# Patient Record
Sex: FEMALE | Race: WHITE
[De-identification: names, ages, dates, MRNs, and addresses within clinical notes are randomized per-mention and may not be internally consistent; named-entity substitution may affect disease eponyms.]

---

## 2017-09-18 ENCOUNTER — HOSPITAL ENCOUNTER (EMERGENCY)
Dept: HOSPITAL 56 - MW.ED | Age: 62
Discharge: HOME | End: 2017-09-18
Payer: MEDICARE

## 2017-09-18 VITALS — DIASTOLIC BLOOD PRESSURE: 71 MMHG | SYSTOLIC BLOOD PRESSURE: 118 MMHG

## 2017-09-18 DIAGNOSIS — E78.00: ICD-10-CM

## 2017-09-18 DIAGNOSIS — F41.9: ICD-10-CM

## 2017-09-18 DIAGNOSIS — F32.9: ICD-10-CM

## 2017-09-18 DIAGNOSIS — Z88.5: ICD-10-CM
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
## 2017-09-18 DIAGNOSIS — S01.81XA: Primary | ICD-10-CM

## 2017-09-18 DIAGNOSIS — F17.210: ICD-10-CM

## 2017-09-18 DIAGNOSIS — Z79.899: ICD-10-CM

## 2017-09-18 DIAGNOSIS — S09.90XA: ICD-10-CM

## 2017-09-18 DIAGNOSIS — I10: ICD-10-CM

## 2017-09-18 NOTE — EDM.PDOC
ED HPI GENERAL MEDICAL PROBLEM





- General


Chief Complaint: Laceration


Stated Complaint: PT FELL AND HURT HEAD


Time Seen by Provider: 17 21:24





- History of Present Illness


INITIAL COMMENTS - FREE TEXT/NARRATIVE: 


HISTORY AND PHYSICAL:





History of present illness:


Patient's a 62-year-old white female presents status post fall which he struck 

her forehead sustaining laceration occurred about 4:00 this morning her [place] 

presentation was in hope that it would not need suturing family can answer 

otherwise no loss consciousness no dizziness no chest pain shortness breath or 

other concern 








Review of systems: 


As per history of present illness and below otherwise all systems reviewed and 

negative.





Past medical history: 


As per history of present illness and as reviewed below otherwise 

noncontributory.





Surgical history: 


As per history of present illness and as reviewed below otherwise 

noncontributory.





Social history: 


No reported history of drug or alcohol abuse.





Family history: 


As per history of present illness and as reviewed below otherwise 

noncontributory.





Physical exam:


HEENT: 3 cm moderate depth laceration of forehead noted no step-off no 

depression or crepitation, normocephalic, pupils reactive, negative for 

conjunctival pallor or scleral icterus, mucous membranes moist, throat clear, 

neck supple, nontender, trachea midline.


Lungs: Clear to auscultation, breath sounds equal bilaterally, chest nontender.


Heart: S1S2, regular, negative for clicks, rubs, or JVD.


Abdomen: Soft, nondistended, nontender. Negative for masses or 

hepatosplenomegaly. Negative for costovertebral tenderness.


Pelvis: Stable nontender.


Genitourinary: Deferred.


Rectal: Deferred.


Extremities: Atraumatic, negative for cords or calf pain. Neurovascular 

unremarkable.


Neuro: Awake, alert, oriented. Cranial nerves II through XII unremarkable. 

Cerebellum unremarkable. Motor and sensory unremarkable throughout. Exam 

nonfocal.





Diagnostics:


Deferred





Therapeutics:


Patient's tetanus was updated she is anesthetized 1% lidocaine without 

epinephrine and irrigated scopes month 0.9 normal saline and closed with 50


 absorbable suture bacitracin was applied


Impression: 


#1 head injury with facial laceration


Definitive disposition and diagnosis as appropriate pending reevaluation and 

review of above.








- Related Data


 Allergies











Allergy/AdvReac Type Severity Reaction Status Date / Time


 


morphine Allergy  Difficulty Verified 17 12:26





   Breathing  











Home Meds: 


 Home Meds





ALPRAZolam [Alprazolam] 1 mg PO TID 17 [History]


DULoxetine [Cymbalta] 60 mg PO BID 17 [History]


Furosemide 20 mg PO DAILY 17 [History]


Gabapentin [Neurontin] 600 mg PO QID PRN 17 [History]


Methylphenidate HCl 20 mg PO BID 17 [History]


Metoprolol Succinate [Toprol XL] 25 mg PO DAILY 17 [History]


Oxybutynin 5 mg PO DAILY 17 [History]


Rosuvastatin [Crestor] 10 mg PO DAILY 17 [History]


amLODIPine [Norvasc] 10 mg PO DAILY 17 [History]


buPROPion HCl [Wellbutrin Xl] 300 mg PO DAILY 17 [History]


buPROPion [Wellbutrin XL] 150 mg PO DAILY 17 [History]











Past Medical History


Cardiovascular History: Reports: High Cholesterol, Hypertension


Musculoskeletal History: Reports: Back Pain, Chronic


Psychiatric History: Reports: Anxiety, Depression





- Past Surgical History


Female  Surgical History: Reports:  Section





Social & Family History





- Family History


Family Medical History: Noncontributory





- Tobacco Use


Smoking Status *Q: Current Every Day Smoker


Years of Tobacco use: 30


Packs/Tins Daily: 1





- Caffeine Use


Caffeine Use: Reports: Soda


Other Caffeine Use: soda all day long





- Recreational Drug Use


Recreational Drug Use: No





ED ROS GENERAL





- Review of Systems


Review Of Systems: ROS reveals no pertinent complaints other than HPI.





ED EXAM, SKIN/RASH


Exam: See Below (See dictation)





Departure





- Departure


Time of Disposition: 21:30


Disposition: Home, Self-Care 01


Condition: Good


Clinical Impression: 


 Head injury, Facial laceration








- Discharge Information


Referrals: 


PCP,None [Primary Care Provider] - 


Additional Instructions: 


The following information is given to patients seen in the emergency department 

who are being discharged to home. This information is to outline your options 

for follow-up care. We provide all patients seen in our emergency department 

with a follow-up referral.





The need for follow-up, as well as the timing and circumstances, are variable 

depending upon the specifics of your emergency department visit.





If you don't have a primary care physician on staff, we will provide you with a 

referral. We always advise you to contact your personal physician following an 

emergency department visit to inform them of the circumstance of the visit and 

for follow-up with them and/or the need for any referrals to a consulting 

specialist.





The emergency department will also refer you to a specialist when appropriate. 

This referral assures that you have the opportunity for followup care with a 

specialist. All of these measure are taken in an effort to provide you with 

optimal care, which includes your followup.





Under all circumstances we always encourage you to contact your private 

physician who remains a resource for coordinating  your care. When calling for 

followup care, please make the office aware that this follow-up is from your 

recent emergency room visit. If for any reason you are refused follow-up, 

please contact the [facility] emergency department at [phone] 

and asked to speak to the emergency department charge nurse.























Wound check PMD follow-up 24-48 hours return as needed as discussed

## 2018-01-23 ENCOUNTER — HOSPITAL ENCOUNTER (INPATIENT)
Dept: HOSPITAL 56 - MW.ED | Age: 63
LOS: 3 days | Discharge: HOME | DRG: 558 | End: 2018-01-26
Attending: INTERNAL MEDICINE | Admitting: INTERNAL MEDICINE
Payer: MEDICARE

## 2018-01-23 DIAGNOSIS — Z88.5: ICD-10-CM

## 2018-01-23 DIAGNOSIS — M79.604: ICD-10-CM

## 2018-01-23 DIAGNOSIS — Z88.2: ICD-10-CM

## 2018-01-23 DIAGNOSIS — Z79.899: ICD-10-CM

## 2018-01-23 DIAGNOSIS — F41.8: ICD-10-CM

## 2018-01-23 DIAGNOSIS — E78.5: ICD-10-CM

## 2018-01-23 DIAGNOSIS — F17.210: ICD-10-CM

## 2018-01-23 DIAGNOSIS — I10: ICD-10-CM

## 2018-01-23 DIAGNOSIS — N39.0: ICD-10-CM

## 2018-01-23 DIAGNOSIS — E78.00: ICD-10-CM

## 2018-01-23 DIAGNOSIS — F17.200: ICD-10-CM

## 2018-01-23 DIAGNOSIS — K21.9: ICD-10-CM

## 2018-01-23 DIAGNOSIS — M62.82: Primary | ICD-10-CM

## 2018-01-23 DIAGNOSIS — J44.9: ICD-10-CM

## 2018-01-23 DIAGNOSIS — M79.605: ICD-10-CM

## 2018-01-23 LAB
APAP SERPL-MCNC: < 3 UG/ML
CHLORIDE SERPL-SCNC: 108 MMOL/L (ref 98–110)
SODIUM SERPL-SCNC: 140 MMOL/L (ref 136–146)

## 2018-01-23 PROCEDURE — 96360 HYDRATION IV INFUSION INIT: CPT

## 2018-01-23 PROCEDURE — 71045 X-RAY EXAM CHEST 1 VIEW: CPT

## 2018-01-23 PROCEDURE — 82553 CREATINE MB FRACTION: CPT

## 2018-01-23 PROCEDURE — 84484 ASSAY OF TROPONIN QUANT: CPT

## 2018-01-23 PROCEDURE — 80305 DRUG TEST PRSMV DIR OPT OBS: CPT

## 2018-01-23 PROCEDURE — G0480 DRUG TEST DEF 1-7 CLASSES: HCPCS

## 2018-01-23 PROCEDURE — 85610 PROTHROMBIN TIME: CPT

## 2018-01-23 PROCEDURE — 81001 URINALYSIS AUTO W/SCOPE: CPT

## 2018-01-23 PROCEDURE — G0008 ADMIN INFLUENZA VIRUS VAC: HCPCS

## 2018-01-23 PROCEDURE — 85025 COMPLETE CBC W/AUTO DIFF WBC: CPT

## 2018-01-23 PROCEDURE — 99285 EMERGENCY DEPT VISIT HI MDM: CPT

## 2018-01-23 PROCEDURE — 83690 ASSAY OF LIPASE: CPT

## 2018-01-23 PROCEDURE — 82150 ASSAY OF AMYLASE: CPT

## 2018-01-23 PROCEDURE — 80053 COMPREHEN METABOLIC PANEL: CPT

## 2018-01-23 PROCEDURE — 82550 ASSAY OF CK (CPK): CPT

## 2018-01-23 PROCEDURE — 87086 URINE CULTURE/COLONY COUNT: CPT

## 2018-01-23 PROCEDURE — 36415 COLL VENOUS BLD VENIPUNCTURE: CPT

## 2018-01-23 RX ADMIN — CEFTRIAXONE SCH MLS/HR: 1 INJECTION, SOLUTION INTRAVENOUS at 14:42

## 2018-01-23 RX ADMIN — DEXTROSE SCH UNITS: 10 SOLUTION INTRAVENOUS at 21:04

## 2018-01-23 RX ADMIN — DEXTROSE SCH UNITS: 10 SOLUTION INTRAVENOUS at 14:44

## 2018-01-23 NOTE — EDM.PDOC
ED HPI GENERAL MEDICAL PROBLEM





- General


Stated Complaint: MENTAL EVAL


Time Seen by Provider: 18 09:07


Source of Information: Reports: Patient





- History of Present Illness


INITIAL COMMENTS - FREE TEXT/NARRATIVE: 





HISTORY AND PHYSICAL:


History of present illness:


[Patient presents with generalized weakness via EMS





She has a history of anxiety and depression she takes Xanax and is having some 

anxiety problems last night mom states she may have taken extra Xanax as she 

was unable walk to the bathroom this morning with and had to crawl with 

assistance due to weakness





However she has been alert speaking with us does not appear to have taken 

additional Xanax her pill count is accurate





No fever nausea vomiting chills sweats does generalized weakness











Denies suicidal homicidal ideation]


Review of systems: 


As per history of present illness and below otherwise all systems reviewed and 

negative.


Past medical history: 


As per history of present illness and as reviewed below otherwise 

noncontributory.


Surgical history: 


As per history of present illness and as reviewed below otherwise 

noncontributory.


Social history: 


No reported history of drug or alcohol abuse.


Family history: 


As per history of present illness and as reviewed below otherwise 

noncontributory.


Physical exam:


HEENT: Atraumatic, normocephalic, pupils reactive, negative for conjunctival 

pallor or scleral icterus, mucous membranes moist, throat clear, neck supple, 

nontender, trachea midline.


Lungs: Clear to auscultation, breath sounds equal bilaterally, chest nontender.


Heart: S1S2, regular, negative for clicks, rubs, or JVD.


Abdomen: Soft, nondistended, nontender. Negative for masses or 

hepatosplenomegaly. Negative for costovertebral tenderness.


Pelvis: Stable nontender.


Genitourinary: Deferred.


Rectal: Deferred.


Extremities: Atraumatic, negative for cords or calf pain. Neurovascular 

unremarkable.


Neuro: Awake, alert, oriented. Cranial nerves II through XII unremarkable. 

Cerebellum unremarkable. Motor and sensory unremarkable throughout. Exam 

nonfocal.


Diagnostics:


[Mental health evaluation/3C lab


]CBC CMP cardiac enzymes








Therapeutics:


[1 L bolus per EMS


1 L bolus here than fluid strength 150


Stop Crestor


]


Impression: 


[Rhabdomyolysis


Renal insufficiency


Dehydration





]


Definitive disposition and diagnosis as appropriate pending reevaluation and 

review of above.





- Related Data


 Allergies











Allergy/AdvReac Type Severity Reaction Status Date / Time


 


morphine Allergy  Difficulty Verified 17 12:26





   Breathing  


 


Sulfa (Sulfonamide Allergy  Hives Verified 17 21:31





Antibiotics)     











Home Meds: 


 Home Meds





ALPRAZolam [Alprazolam] 1 mg PO TID 17 [History]


DULoxetine [Cymbalta] 60 mg PO BID 17 [History]


Furosemide 20 mg PO DAILY 17 [History]


Gabapentin [Neurontin] 600 mg PO QID PRN 17 [History]


Methylphenidate HCl 20 mg PO BID 17 [History]


Metoprolol Succinate [Toprol XL] 25 mg PO DAILY 17 [History]


Oxybutynin 5 mg PO DAILY 17 [History]


Rosuvastatin [Crestor] 10 mg PO DAILY 17 [History]


amLODIPine [Norvasc] 10 mg PO DAILY 17 [History]


buPROPion HCl [Wellbutrin Xl] 300 mg PO DAILY 17 [History]


buPROPion [Wellbutrin XL] 150 mg PO DAILY 17 [History]











Past Medical History


Cardiovascular History: Reports: High Cholesterol, Hypertension


OB/GYN History: Reports: Pregnancy


Musculoskeletal History: Reports: Back Pain, Chronic


Psychiatric History: Reports: Anxiety, Depression





- Past Surgical History


HEENT Surgical History: Reports: Tonsillectomy


Female  Surgical History: Reports:  Section





Social & Family History





- Family History


Family Medical History: Noncontributory





- Tobacco Use


Smoking Status *Q: Current Every Day Smoker


Years of Tobacco use: 20


Packs/Tins Daily: 1





- Caffeine Use


Caffeine Use: Reports: Coffee


Other Caffeine Use: soda all day long





- Recreational Drug Use


Recreational Drug Use: No





ED ROS GENERAL





- Review of Systems


Review Of Systems: ROS reveals no pertinent complaints other than HPI.





ED EXAM, GENERAL





- Physical Exam


Exam: See Below





Course





- Vital Signs


Last Recorded V/S: 


 Last Vital Signs











Temp  97.4 F   18 09:13


 


Pulse  81   18 09:59


 


Resp  13   18 09:59


 


BP  137/87   18 09:59


 


Pulse Ox  92 L  18 09:59














- Orders/Labs/Meds


Orders: 


 Active Orders 24 hr











 Category Date Time Status


 


 EKG Documentation Completion [RC] STAT Care  18 09:06 Active


 


 Sodium Chloride 0.9% [Normal Saline] 1,000 ml Med  18 10:08 Active





 IV STAT   








 Medication Orders





Sodium Chloride (Normal Saline)  1,000 mls @ 999 mls/hr IV STAT ONE


   Stop: 18 11:08


   Last Admin: 18 10:29  Dose: 999 mls/hr








Labs: 


 Laboratory Tests











  18 Range/Units





  09:19 09:19 09:19 


 


WBC  14.24 H    (4.0-11.0)  K/uL


 


RBC  4.66    (4.30-5.90)  M/uL


 


Hgb  14.0    (12.0-16.0)  g/dL


 


Hct  41.4    (36.0-46.0)  %


 


MCV  88.8    (80.0-98.0)  fL


 


MCH  30.0    (27.0-32.0)  pg


 


MCHC  33.8    (31.0-37.0)  g/dL


 


RDW Std Deviation  46.4    (28.0-62.0)  fl


 


RDW Coeff of Marla  14    (11.0-15.0)  %


 


Plt Count  266    (150-400)  K/uL


 


MPV  10.50    (7.40-12.00)  fL


 


Neut % (Auto)  76.4    (48.0-80.0)  %


 


Lymph % (Auto)  16.0    (16.0-40.0)  %


 


Mono % (Auto)  6.3    (0.0-15.0)  %


 


Eos % (Auto)  0.9    (0.0-7.0)  %


 


Baso % (Auto)  0.4    (0.0-1.5)  %


 


Neut # (Auto)  10.9 H    (1.4-5.7)  K/uL


 


Lymph # (Auto)  2.3    (0.6-2.4)  K/uL


 


Mono # (Auto)  0.9 H    (0.0-0.8)  K/uL


 


Eos # (Auto)  0.1    (0.0-0.7)  K/uL


 


Baso # (Auto)  0.1    (0.0-0.1)  K/uL


 


Nucleated RBC %  0.0    /100WBC


 


Nucleated RBCs #  0    K/uL


 


INR   1.01   


 


Sodium    140  (136-146)  mmol/L


 


Potassium    4.1  (3.5-5.1)  mmol/L


 


Chloride    108  ()  mmol/L


 


Carbon Dioxide    21  (21-31)  mmol/L


 


BUN    21  (6.0-23.0)  mg/dL


 


Creatinine    1.6 H  (0.6-1.5)  mg/dL


 


Est Cr Clr Drug Dosing    26.52  mL/min


 


Estimated GFR (MDRD)    32.8  ml/min


 


Glucose    108  ()  mg/dL


 


Calcium    9.1  (8.8-10.8)  mg/dL


 


Total Bilirubin    0.8  (0.1-1.5)  mg/dL


 


AST    140 H  (5-40)  IU/L


 


ALT    30  (8-54)  IU/L


 


Alkaline Phosphatase    92  ()  


 


Creatine Kinase    57424 H  (9-236)  IU/L


 


CK-MB (CK-2)    31.1 H  (0-6.6)  ng/ml


 


Troponin I    < 0.10  (0.0-0.29)  NG/ML


 


Total Protein    6.6  (6.0-8.0)  g/dL


 


Albumin    3.8  (3.4-4.8)  g/dL


 


Globulin    2.8  (2.0-3.5)  g/dL


 


Albumin/Globulin Ratio    1.4  (1.3-2.8)  


 


Amylase    28  (10-90)  U/L


 


Lipase    < 9  (7-80)  U/L


 


Urine Color     


 


Urine Appearance     


 


Urine pH     (5.0-8.0)  


 


Ur Specific Gravity     (1.001-1.035)  


 


Urine Protein     (NEGATIVE)  mg/dL


 


Urine Glucose (UA)     (NEGATIVE)  mg/dL


 


Urine Ketones     (NEGATIVE)  mg/dL


 


Urine Occult Blood     (NEGATIVE)  


 


Urine Nitrite     (NEGATIVE)  


 


Urine Bilirubin     (NEGATIVE)  


 


Urine Urobilinogen     (<2.0)  EU/dL


 


Ur Leukocyte Esterase     (NEGATIVE)  


 


Urine RBC     (0-2/HPF)  


 


Urine WBC     (0-5/HPF)  


 


Ur Epithelial Cells     (NONE-FEW)  


 


Urine Bacteria     (NEGATIVE)  


 


Urine Mucus     (NONE-MOD)  


 


Salicylates    < 5.0  (0-20)  mg/dL


 


Urine Opiates Screen     (NEGATIVE)  


 


Ur Oxycodone Screen     (NEGATIVE)  


 


Urine Methadone Screen     (NEGATIVE)  


 


Acetaminophen    < 3.0  ug/mL


 


Ur Barbiturates Screen     (NEGATIVE)  


 


Ur Phencyclidine Scrn     (NEGATIVE)  


 


Ur Amphetamine Screen     (NEGATIVE)  


 


U Methamphetamines Scrn     (NEGATIVE)  


 


U Benzodiazepines Scrn     (NEGATIVE)  


 


U Cocaine Metab Screen     (NEGATIVE)  


 


U Marijuana (THC) Screen     (NEGATIVE)  


 


Ethyl Alcohol    < 10.0  mg/dL














  18 Range/Units





  09:48 09:48 


 


WBC    (4.0-11.0)  K/uL


 


RBC    (4.30-5.90)  M/uL


 


Hgb    (12.0-16.0)  g/dL


 


Hct    (36.0-46.0)  %


 


MCV    (80.0-98.0)  fL


 


MCH    (27.0-32.0)  pg


 


MCHC    (31.0-37.0)  g/dL


 


RDW Std Deviation    (28.0-62.0)  fl


 


RDW Coeff of Marla    (11.0-15.0)  %


 


Plt Count    (150-400)  K/uL


 


MPV    (7.40-12.00)  fL


 


Neut % (Auto)    (48.0-80.0)  %


 


Lymph % (Auto)    (16.0-40.0)  %


 


Mono % (Auto)    (0.0-15.0)  %


 


Eos % (Auto)    (0.0-7.0)  %


 


Baso % (Auto)    (0.0-1.5)  %


 


Neut # (Auto)    (1.4-5.7)  K/uL


 


Lymph # (Auto)    (0.6-2.4)  K/uL


 


Mono # (Auto)    (0.0-0.8)  K/uL


 


Eos # (Auto)    (0.0-0.7)  K/uL


 


Baso # (Auto)    (0.0-0.1)  K/uL


 


Nucleated RBC %    /100WBC


 


Nucleated RBCs #    K/uL


 


INR    


 


Sodium    (136-146)  mmol/L


 


Potassium    (3.5-5.1)  mmol/L


 


Chloride    ()  mmol/L


 


Carbon Dioxide    (21-31)  mmol/L


 


BUN    (6.0-23.0)  mg/dL


 


Creatinine    (0.6-1.5)  mg/dL


 


Est Cr Clr Drug Dosing    mL/min


 


Estimated GFR (MDRD)    ml/min


 


Glucose    ()  mg/dL


 


Calcium    (8.8-10.8)  mg/dL


 


Total Bilirubin    (0.1-1.5)  mg/dL


 


AST    (5-40)  IU/L


 


ALT    (8-54)  IU/L


 


Alkaline Phosphatase    ()  


 


Creatine Kinase    (9-236)  IU/L


 


CK-MB (CK-2)    (0-6.6)  ng/ml


 


Troponin I    (0.0-0.29)  NG/ML


 


Total Protein    (6.0-8.0)  g/dL


 


Albumin    (3.4-4.8)  g/dL


 


Globulin    (2.0-3.5)  g/dL


 


Albumin/Globulin Ratio    (1.3-2.8)  


 


Amylase    (10-90)  U/L


 


Lipase    (7-80)  U/L


 


Urine Color   YELLOW  


 


Urine Appearance   CLEAR  


 


Urine pH   6.0  (5.0-8.0)  


 


Ur Specific Gravity   1.020  (1.001-1.035)  


 


Urine Protein   TRACE  (NEGATIVE)  mg/dL


 


Urine Glucose (UA)   NEGATIVE  (NEGATIVE)  mg/dL


 


Urine Ketones   NEGATIVE  (NEGATIVE)  mg/dL


 


Urine Occult Blood   LARGE H  (NEGATIVE)  


 


Urine Nitrite   NEGATIVE  (NEGATIVE)  


 


Urine Bilirubin   NEGATIVE  (NEGATIVE)  


 


Urine Urobilinogen   0.2  (<2.0)  EU/dL


 


Ur Leukocyte Esterase   SMALL  (NEGATIVE)  


 


Urine RBC   0-1  (0-2/HPF)  


 


Urine WBC   3-5  (0-5/HPF)  


 


Ur Epithelial Cells   FEW  (NONE-FEW)  


 


Urine Bacteria   FEW  (NEGATIVE)  


 


Urine Mucus   LIGHT  (NONE-MOD)  


 


Salicylates    (0-20)  mg/dL


 


Urine Opiates Screen  NEGATIVE   (NEGATIVE)  


 


Ur Oxycodone Screen  NEGATIVE   (NEGATIVE)  


 


Urine Methadone Screen  NEGATIVE   (NEGATIVE)  


 


Acetaminophen    ug/mL


 


Ur Barbiturates Screen  NEGATIVE   (NEGATIVE)  


 


Ur Phencyclidine Scrn  NEGATIVE   (NEGATIVE)  


 


Ur Amphetamine Screen  NEGATIVE   (NEGATIVE)  


 


U Methamphetamines Scrn  NEGATIVE   (NEGATIVE)  


 


U Benzodiazepines Scrn  POSITIVE   (NEGATIVE)  


 


U Cocaine Metab Screen  NEGATIVE   (NEGATIVE)  


 


U Marijuana (THC) Screen  NEGATIVE   (NEGATIVE)  


 


Ethyl Alcohol    mg/dL











Meds: 


Medications











Generic Name Dose Route Start Last Admin





  Trade Name Freq  PRN Reason Stop Dose Admin


 


Sodium Chloride  1,000 mls @ 999 mls/hr  18 10:08  18 10:29





  Normal Saline  IV  18 11:08  999 mls/hr





  STAT ONE   Administration














Departure





- Departure


Time of Disposition: 10:55


Disposition: Home, Self-Care 01


Condition: Fair


Clinical Impression: 


 Rhabdomyolysis








- Discharge Information


Referrals: 


PCP,Unknown [Primary Care Provider] - 





- My Orders


Last 24 Hours: 


My Active Orders





18 09:06


EKG Documentation Completion [RC] STAT 





18 10:08


Sodium Chloride 0.9% [Normal Saline] 1,000 ml IV STAT 














- Assessment/Plan


Last 24 Hours: 


My Active Orders





18 09:06


EKG Documentation Completion [RC] STAT 





18 10:08


Sodium Chloride 0.9% [Normal Saline] 1,000 ml IV STAT

## 2018-01-23 NOTE — PCM.HP
H&P History of Present Illness





- General


Admit Problem/Dx: 


 Admission Diagnosis/Problem





Admission Diagnosis/Problem      Rhabdomyolysis











- History of Present Illness


Initial Comments - Free Text/Narative: 





63 yo female with pmh of anxiety and depression who presents to the ED with 

complaint of generalized weakness.  PAtient reports muscle aches in her left 

leg and not being able to get up to go the bathroom.  She report periodic pains 

in the past but these have gone away.  She appears lethargic but patient denies 

any drowsiness and denies taking any extra xanax, alcohol or illicit drugs.  

She has been taking her crestor.  She reports she gets out of bed daily and has 

not been lying down more than usual.





- Related Data


Allergies/Adverse Reactions: 


 Allergies











Allergy/AdvReac Type Severity Reaction Status Date / Time


 


morphine Allergy  Difficulty Verified 17 12:26





   Breathing  


 


Sulfa (Sulfonamide Allergy  Hives Verified 17 21:31





Antibiotics)     











Home Medications: 


 Home Meds





ALPRAZolam [Alprazolam] 0.5 - 1 mg PO TID PRN 17 [History]


DULoxetine [Cymbalta] 60 mg PO BID 17 [History]


Furosemide 20 mg PO DAILY 17 [History]


Methylphenidate HCl 20 mg PO TID 17 [History]


Metoprolol Succinate [Toprol XL] 25 mg PO DAILY 17 [History]


Oxybutynin 5 mg PO DAILY 17 [History]


Rosuvastatin [Crestor] 10 mg PO BEDTIME 17 [History]


amLODIPine [Norvasc] 10 mg PO DAILY 17 [History]


Omeprazole 40 mg PO DAILY 18 [History]


lamoTRIgine [Lamotrigine] 100 mg PO DAILY 18 [History]


buPROPion HCl [Wellbutrin Xl] 150 mg PO DAILY 18 [History]


buPROPion HCl [Wellbutrin Xl] 300 mg PO DAILY 18 [History]











Past Medical History


HEENT History: Reports: Cataract


Cardiovascular History: Reports: High Cholesterol, Hypertension


Respiratory History: Reports: COPD


Gastrointestinal History: Reports: GERD


OB/GYN History: Reports: Pregnancy


Musculoskeletal History: Reports: Back Pain, Chronic


Psychiatric History: Reports: Anxiety, Depression





- Infectious Disease History


Infectious Disease History: Reports: Chicken Pox, Measles, Mumps





- Past Surgical History


HEENT Surgical History: Reports: Cataract Surgery, Tonsillectomy


Cardiovascular Surgical History: Reports: None


Respiratory Surgical History: Reports: None


GI Surgical History: Reports: None


Female  Surgical History: Reports:  Section, Nephrectomy


Musculoskeletal Surgical History: Reports: None





Social & Family History





- Family History


Family Medical History: Noncontributory





- Tobacco Use


Smoking Status *Q: Current Every Day Smoker


Years of Tobacco use: 30


Packs/Tins Daily: 0.5


Second Hand Smoke Exposure: No





- Caffeine Use


Caffeine Use: Reports: Soda


Other Caffeine Use: soda all day long





- Recreational Drug Use


Recreational Drug Use: No





H&P Review of Systems





- Review of Systems:


Review Of Systems: ROS reveals no pertinent complaints other than HPI.





Exam





- Exam


Exam: See Below





- Vital Signs


Vital Signs: 


 Last Vital Signs











Temp  36.6 C   18 12:47


 


Pulse  83   18 12:47


 


Resp  16   18 12:47


 


BP  143/74 H  18 12:47


 


Pulse Ox  89 L  18 12:47











Weight: 83.053 kg





- Exam


General: Alert, Oriented


HEENT: Mucosa Moist & Pink


Lungs: Clear to Auscultation, Normal Respiratory Effort


Cardiovascular: Regular Rate, Regular Rhythm


GI/Abdominal Exam: Soft, Non-Tender


Extremities: Normal Range of Motion, Non-Tender, No Pedal Edema, Normal 

Capillary Refill.  No: Joint Swelling, Minerva's Sign, Leg Pain, Mottled, Redness


Skin: Warm, Dry, Intact


Neurological: Normal Tone, Sensation Intact





- Patient Data


Result Diagrams: 


 18 04:09





 18 04:09





*Q Meaningful Use (ADM)





- VTE *Q


VTE Criteria *Q: 








- Stroke *Q


Stroke Criteria *Q: 








- AMI *Q


AMI Criteria *Q: 





Problem List Initiated/Reviewed/Updated: Yes


Orders Last 24hrs: 


 Active Orders 24 hr











 Category Date Time Status


 


 Antiembolic Devices [RC] PER UNIT ROUTINE Care  18 13:24 Ordered


 


 VTE/DVT Education [RC] PER UNIT ROUTINE Care  18 13:23 Ordered


 


 Vital Signs [RC] Q4H Care  18 13:23 Ordered


 


 Regular Diet [DIET] Diet  18 Breakfast Ordered


 


 BASIC METABOLIC PANEL,BMP [CHEM] AM Lab  18 05:11 Ordered


 


 BASIC METABOLIC PANEL,BMP [CHEM] AM Lab  18 05:11 Ordered


 


 CBC WITH AUTO DIFF [HEME] AM Lab  18 05:11 Ordered


 


 CBC WITH AUTO DIFF [HEME] AM Lab  18 05:11 Ordered


 


 CPK [CREATINE KINASE,CK] [CHEM] AM Lab  18 05:11 Ordered


 


 CPK [CREATINE KINASE,CK] [CHEM] AM Lab  18 05:11 Ordered


 


 CPK [CREATINE KINASE,CK] [CHEM] Routine Lab  18 18:00 Ordered


 


 CULTURE URINE [] Routine Lab  18 12:54 Uncollected


 


 INFLUENZA A+B AG SCREEN [RM] Routine Lab  18 13:23 Uncollected


 


 Enoxaparin [Lovenox] Med  18 09:00 Ordered





 40 mg SUBCUT DAILY   


 


 Ondansetron [Zofran] Med  18 13:23 Ordered





 4 mg IVPUSH Q4H PRN   


 


 Sodium Chloride 0.9% [Normal Saline] 1,000 ml Med  18 13:22 Ordered





 IV .Bolus   


 


 Sodium Chloride 0.9% [Normal Saline] 1,000 ml Med  18 13:30 Ordered





 IV ASDIRECTED   


 


 cefTRIAXone [Rocephin in Dextrose,Iso-Osm 1 GM/50 ML] 1 Med  18 13:00 

Active





 gm   





 Premix Bag 1 bag   





 IV Q24H   


 


 Sequential Compression Device [OM.PC] Routine Oth  18 13:23 Ordered


 


 Resuscitation Status Routine Resus Stat  18 13:23 Ordered








 Medication Orders





Enoxaparin Sodium (Lovenox)  40 mg SUBCUT DAILY SCOTTY


Ceftriaxone Sodium/Dextrose 1 (gm/ Premix)  50 mls @ 100 mls/hr IV Q24H SCOTTY


Sodium Chloride (Normal Saline)  1,000 mls @ 1,000 mls/hr IV .Bolus ONE


   Stop: 18 14:21


Sodium Chloride (Normal Saline)  1,000 mls @ 250 mls/hr IV ASDIRECTED SCOTTY


Ondansetron HCl (Zofran)  4 mg IVPUSH Q4H PRN


   PRN Reason: Nausea/Vomiting








Assessment/Plan Comment:: 





63 yo female admitted for rhabdomyolysis and UTI.  Will resuscitate 

appropriately with IV fluids and trend CPK.  Will place on rocephin and get 

urine cultures.  Her rhabdomyolysis could be from her crestor or xanax so will 

hold the medications.

## 2018-01-24 LAB
CHLORIDE SERPL-SCNC: 113 MMOL/L (ref 98–110)
SODIUM SERPL-SCNC: 145 MMOL/L (ref 136–146)

## 2018-01-24 RX ADMIN — CEFTRIAXONE SCH MLS/HR: 1 INJECTION, SOLUTION INTRAVENOUS at 13:08

## 2018-01-24 RX ADMIN — BUPROPION HYDROCHLORIDE SCH MG: 150 TABLET, EXTENDED RELEASE ORAL at 15:40

## 2018-01-24 RX ADMIN — DEXTROSE SCH UNITS: 10 SOLUTION INTRAVENOUS at 08:16

## 2018-01-24 RX ADMIN — DEXTROSE SCH UNITS: 10 SOLUTION INTRAVENOUS at 20:48

## 2018-01-24 RX ADMIN — DEXTROSE SCH: 10 SOLUTION INTRAVENOUS at 21:08

## 2018-01-24 RX ADMIN — DEXTROSE SCH UNITS: 10 SOLUTION INTRAVENOUS at 14:45

## 2018-01-24 NOTE — PCM.PN
- General Info


Date of Service: 01/24/18


Subjective Update: 





Patient states that her weakness has improved. She is anxious and is adamant 

about leaving because she would like to smoke a cigarette. She was also 

concerned that she hasn't received her Xanax while she was here. Besides this, 

she denies having any complaints such as fevers chills pain nausea or vomiting.





- Patient Data


Vitals - Most Recent: 


 Last Vital Signs











Temp  36.3 C   01/24/18 12:00


 


Pulse  98   01/24/18 12:00


 


Resp  16   01/24/18 12:00


 


BP  129/78   01/24/18 12:00


 


Pulse Ox  96   01/24/18 12:00











Weight - Most Recent: 83.053 kg


I&O - Last 24 Hours: 


 Intake & Output











 01/23/18 01/24/18 01/24/18





 22:59 06:59 14:59


 


Intake Total 3100 2250 


 


Output Total 800 1580 


 


Balance 2300 670 











Lab Results Last 24 Hours: 


 Laboratory Results - last 24 hr











  01/23/18 01/23/18 01/23/18 Range/Units





  16:05 17:57 21:41 


 


WBC     (4.0-11.0)  K/uL


 


RBC     (4.30-5.90)  M/uL


 


Hgb     (12.0-16.0)  g/dL


 


Hct     (36.0-46.0)  %


 


MCV     (80.0-98.0)  fL


 


MCH     (27.0-32.0)  pg


 


MCHC     (31.0-37.0)  g/dL


 


RDW Std Deviation     (28.0-62.0)  fl


 


RDW Coeff of Marla     (11.0-15.0)  %


 


Plt Count     (150-400)  K/uL


 


MPV     (7.40-12.00)  fL


 


Neut % (Auto)     (48.0-80.0)  %


 


Lymph % (Auto)     (16.0-40.0)  %


 


Mono % (Auto)     (0.0-15.0)  %


 


Eos % (Auto)     (0.0-7.0)  %


 


Baso % (Auto)     (0.0-1.5)  %


 


Neut # (Auto)     (1.4-5.7)  K/uL


 


Lymph # (Auto)     (0.6-2.4)  K/uL


 


Mono # (Auto)     (0.0-0.8)  K/uL


 


Eos # (Auto)     (0.0-0.7)  K/uL


 


Baso # (Auto)     (0.0-0.1)  K/uL


 


Nucleated RBC %     /100WBC


 


Nucleated RBCs #     K/uL


 


Sodium     (136-146)  mmol/L


 


Potassium     (3.5-5.1)  mmol/L


 


Chloride     ()  mmol/L


 


Carbon Dioxide     (21-31)  mmol/L


 


BUN     (6.0-23.0)  mg/dL


 


Creatinine     (0.6-1.5)  mg/dL


 


Est Cr Clr Drug Dosing     mL/min


 


Estimated GFR (MDRD)     ml/min


 


Glucose     ()  mg/dL


 


Calcium     (8.8-10.8)  mg/dL


 


Creatine Kinase   13467 H   (9-236)  IU/L


 


Troponin I  < 0.10   < 0.10  (0.0-0.29)  NG/ML














  01/24/18 01/24/18 Range/Units





  04:09 04:09 


 


WBC  9.36   (4.0-11.0)  K/uL


 


RBC  4.09 L   (4.30-5.90)  M/uL


 


Hgb  12.2   (12.0-16.0)  g/dL


 


Hct  36.7   (36.0-46.0)  %


 


MCV  89.7   (80.0-98.0)  fL


 


MCH  29.8   (27.0-32.0)  pg


 


MCHC  33.2   (31.0-37.0)  g/dL


 


RDW Std Deviation  47.5   (28.0-62.0)  fl


 


RDW Coeff of Marla  14   (11.0-15.0)  %


 


Plt Count  246   (150-400)  K/uL


 


MPV  11.00   (7.40-12.00)  fL


 


Neut % (Auto)  55.9   (48.0-80.0)  %


 


Lymph % (Auto)  34.7   (16.0-40.0)  %


 


Mono % (Auto)  5.7   (0.0-15.0)  %


 


Eos % (Auto)  3.2   (0.0-7.0)  %


 


Baso % (Auto)  0.5   (0.0-1.5)  %


 


Neut # (Auto)  5.2   (1.4-5.7)  K/uL


 


Lymph # (Auto)  3.3 H   (0.6-2.4)  K/uL


 


Mono # (Auto)  0.5   (0.0-0.8)  K/uL


 


Eos # (Auto)  0.3   (0.0-0.7)  K/uL


 


Baso # (Auto)  0.1   (0.0-0.1)  K/uL


 


Nucleated RBC %  0.0   /100WBC


 


Nucleated RBCs #  0   K/uL


 


Sodium   145  (136-146)  mmol/L


 


Potassium   3.9  (3.5-5.1)  mmol/L


 


Chloride   113 H  ()  mmol/L


 


Carbon Dioxide   24  (21-31)  mmol/L


 


BUN   8  (6.0-23.0)  mg/dL


 


Creatinine   0.8  (0.6-1.5)  mg/dL


 


Est Cr Clr Drug Dosing   53.04  mL/min


 


Estimated GFR (MDRD)   > 60.0  ml/min


 


Glucose   74  ()  mg/dL


 


Calcium   8.1 L  (8.8-10.8)  mg/dL


 


Creatine Kinase   11918 H  (9-236)  IU/L


 


Troponin I    (0.0-0.29)  NG/ML











[facility] Results Last 24 Hours: 


 Microbiology











 01/23/18 13:40 Influenza Type A Antigen Screen - Final





 Nasopharyngeal Swab - Nare, Left    NEGATIVE INFLUENZA A VIRUS AG





 Influenza Type B Antigen Screen - Final





    NEGATIVE INFLUENZA B VIRUS AG











Med Orders - Current: 


 Current Medications





Alprazolam (Xanax)  0.5 mg PO TID PRN


   PRN Reason: Anxiety


   Last Admin: 01/24/18 10:51 Dose:  0.5 mg


Heparin Sodium (Porcine) (Heparin Sodium)  5,000 units SUBCUT Q8H [facility]


   Last Admin: 01/24/18 08:16 Dose:  5,000 units


Ceftriaxone Sodium/Dextrose 1 (gm/ Premix)  50 mls @ 100 mls/hr IV Q24H [facility]


   Last Admin: 01/24/18 13:08 Dose:  100 mls/hr


Sodium Chloride (Normal Saline)  1,000 mls @ 250 mls/hr IV ASDIRECTED [facility]


   Last Admin: 01/24/18 10:39 Dose:  250 mls/hr


Nicotine (Habitrol)  21 mg TRDERM DAILY [facility]


   Last Admin: 01/24/18 10:26 Dose:  21 mg


Ondansetron HCl (Zofran)  4 mg IVPUSH Q4H PRN


   PRN Reason: Nausea/Vomiting


Oxybutynin Chloride (Oxybutynin)  5 mg PO DAILY [facility]


   Last Admin: 01/24/18 08:07 Dose:  Not Given





Discontinued Medications





Alprazolam (Xanax)  1 mg PO TID PRN


   PRN Reason: Anxiety


Sodium Chloride (Normal Saline)  1,000 mls @ 999 mls/hr IV STAT ONE


   Stop: 01/23/18 11:08


   Last Admin: 01/23/18 10:29 Dose:  999 mls/hr


Sodium Chloride (Normal Saline)  1,000 mls @ 1,000 mls/hr IV .Bolus ONE


   Stop: 01/23/18 13:23


   Last Admin: 01/23/18 13:30 Dose:  1,000 mls/hr


Sodium Chloride (Normal Saline)  1,000 mls @ 1,000 mls/hr IV .Bolus ONE


   Stop: 01/23/18 14:21


   Last Admin: 01/23/18 15:21 Dose:  1,000 mls/hr


Influenza Virus Vaccine (Pharmacy To Dose - Influenza Vaccine)  1 each IM 

ONETIME ONE


   Stop: 01/23/18 12:41


Influenza Virus Vaccine (Fluarix Quad [id number])  60 mcg IM .ONCE ONE


   Stop: 01/23/18 12:46


   Last Admin: 01/24/18 10:25 Dose:  60 mcg











- Exam


General: Alert, Oriented


Neck: Supple


Lungs: Clear to Auscultation, Normal Respiratory Effort


Cardiovascular: Regular Rate, Regular Rhythm


Extremities: Normal Inspection, Normal Range of Motion, Non-Tender, No Pedal 

Edema





- Problem List Review


Problem List Initiated/Reviewed/Updated: Yes





- My Orders


Last 24 Hours: 


My Active Orders





01/24/18 09:30


Nicotine [Habitrol]   21 mg TRDERM DAILY 





01/24/18 11:00


ALPRAZolam [Xanax]   0.5 mg PO TID PRN 














- Plan


Plan:: 





Assessment:


#1. Rhabdomyolysis


#2. Urinary tract infection


#3. History of anxiety and depression


#4. Tobacco abuse





Plan:


#1. Continue IV fluids at 250 mL an hour of normal saline. Monitor for signs of 

fluid overload. Recheck CK in the morning.


#2. Continue Rocephin for urinary tract infection


#3. Restart home medication that for anxiety. Nicotine patch for tobacco 

dependence.


#4. The patient was adamant about leaving AMA because she wanted to smoke a 

cigarette, we did have a long discussion with her and her mother about the 

importance of staying given the rhabdomyolysis and that it can be life-

threatening. Patient understood and decided to stay after Xanax was restarted.

## 2018-01-25 LAB
CHLORIDE SERPL-SCNC: 111 MMOL/L (ref 98–110)
SODIUM SERPL-SCNC: 141 MMOL/L (ref 136–146)

## 2018-01-25 RX ADMIN — DEXTROSE SCH UNITS: 10 SOLUTION INTRAVENOUS at 21:48

## 2018-01-25 RX ADMIN — DEXTROSE SCH UNITS: 10 SOLUTION INTRAVENOUS at 14:18

## 2018-01-25 RX ADMIN — BUPROPION HYDROCHLORIDE SCH MG: 150 TABLET, EXTENDED RELEASE ORAL at 08:26

## 2018-01-25 RX ADMIN — DEXTROSE SCH UNITS: 10 SOLUTION INTRAVENOUS at 05:25

## 2018-01-25 NOTE — PCM.PN
- General Info


Date of Service: 01/25/18


Admission Dx/Problem (Free Text): 





Patient seen at the bedside today with no complaints. She tells me that she 

feels a lot less anxious after receiving her home medications. Denies any 

shortness of breath, leg pain, headache chest pain. No signs of fluid overload.





- Review of Systems


General: Reports: Other (See history of present illness)





- Patient Data


Vitals - Most Recent: 


 Last Vital Signs











Temp  37.0 C   01/25/18 08:00


 


Pulse  79   01/25/18 08:00


 


Resp  20   01/25/18 08:00


 


BP  153/71 H  01/25/18 08:00


 


Pulse Ox  93 L  01/25/18 08:00











Weight - Most Recent: 83.053 kg


I&O - Last 24 Hours: 


 Intake & Output











 01/24/18 01/25/18 01/25/18





 22:59 06:59 14:59


 


Intake Total 1340 3383 


 


Output Total 1250 1600 


 


Balance 90 1783 











Lab Results Last 24 Hours: 


 Laboratory Results - last 24 hr











  01/25/18 01/25/18 Range/Units





  04:46 04:46 


 


WBC  8.76   (4.0-11.0)  K/uL


 


RBC  4.38   (4.30-5.90)  M/uL


 


Hgb  13.1   (12.0-16.0)  g/dL


 


Hct  38.4   (36.0-46.0)  %


 


MCV  87.7   (80.0-98.0)  fL


 


MCH  29.9   (27.0-32.0)  pg


 


MCHC  34.1   (31.0-37.0)  g/dL


 


RDW Std Deviation  45.1   (28.0-62.0)  fl


 


RDW Coeff of Marla  14   (11.0-15.0)  %


 


Plt Count  252   (150-400)  K/uL


 


MPV  11.10   (7.40-12.00)  fL


 


Neut % (Auto)  66.2   (48.0-80.0)  %


 


Lymph % (Auto)  23.4   (16.0-40.0)  %


 


Mono % (Auto)  7.3   (0.0-15.0)  %


 


Eos % (Auto)  2.6   (0.0-7.0)  %


 


Baso % (Auto)  0.5   (0.0-1.5)  %


 


Neut # (Auto)  5.8 H   (1.4-5.7)  K/uL


 


Lymph # (Auto)  2.1   (0.6-2.4)  K/uL


 


Mono # (Auto)  0.6   (0.0-0.8)  K/uL


 


Eos # (Auto)  0.2   (0.0-0.7)  K/uL


 


Baso # (Auto)  0.0   (0.0-0.1)  K/uL


 


Nucleated RBC %  0.0   /100WBC


 


Nucleated RBCs #  0   K/uL


 


Sodium   141  (136-146)  mmol/L


 


Potassium   3.0 L  (3.5-5.1)  mmol/L


 


Chloride   111 H  ()  mmol/L


 


Carbon Dioxide   20 L  (21-31)  mmol/L


 


BUN   3 L  (6.0-23.0)  mg/dL


 


Creatinine   0.7  (0.6-1.5)  mg/dL


 


Est Cr Clr Drug Dosing   60.62  mL/min


 


Estimated GFR (MDRD)   > 60.0  ml/min


 


Glucose   91  ()  mg/dL


 


Calcium   8.4 L  (8.8-10.8)  mg/dL


 


Creatine Kinase   09215 H  (9-236)  IU/L











Med Orders - Current: 


 Current Medications





Alprazolam (Xanax)  0.5 mg PO TID PRN


   PRN Reason: Anxiety


   Last Admin: 01/25/18 08:32 Dose:  0.5 mg


Bupropion HCl (Wellbutrin Xl)  450 mg PO DAILY [facility]


   Last Admin: 01/25/18 08:26 Dose:  450 mg


Cephalexin (Keflex)  500 mg PO Q12H [facility]


   Stop: 01/30/18 23:59


   Last Admin: 01/25/18 08:26 Dose:  500 mg


Duloxetine HCl (Cymbalta)  60 mg PO BID [facility]


   Last Admin: 01/25/18 08:25 Dose:  60 mg


Heparin Sodium (Porcine) (Heparin Sodium)  5,000 units SUBCUT Q8H [facility]


   Last Admin: 01/25/18 05:25 Dose:  5,000 units


Sodium Chloride (Normal Saline)  1,000 mls @ 250 mls/hr IV ASDIRECTED [facility]


   Last Admin: 01/25/18 08:02 Dose:  250 mls/hr


Nicotine (Habitrol)  21 mg TRDERM DAILY [facility]


   Last Admin: 01/25/18 08:26 Dose:  21 mg


Ondansetron HCl (Zofran)  4 mg IVPUSH Q4H PRN


   PRN Reason: Nausea/Vomiting


Oxybutynin Chloride (Oxybutynin)  5 mg PO BEDTIME [facility]


   Last Admin: 01/24/18 17:58 Dose:  5 mg





Discontinued Medications





Alprazolam (Xanax)  1 mg PO TID PRN


   PRN Reason: Anxiety


Bupropion HCl (Wellbutrin Xl)  150 mg PO DAILY [facility]


Bupropion HCl (Wellbutrin Xl)  300 mg PO DAILY [facility]


Bupropion HCl (Wellbutrin Xl)  300 mg PO DAILY [facility]


   Last Admin: 01/24/18 19:28 Dose:  Not Given


Sodium Chloride (Normal Saline)  1,000 mls @ 999 mls/hr IV STAT ONE


   Stop: 01/23/18 11:08


   Last Admin: 01/23/18 10:29 Dose:  999 mls/hr


Sodium Chloride (Normal Saline)  1,000 mls @ 1,000 mls/hr IV .Bolus ONE


   Stop: 01/23/18 13:23


   Last Admin: 01/23/18 13:30 Dose:  1,000 mls/hr


Ceftriaxone Sodium/Dextrose 1 (gm/ Premix)  50 mls @ 100 mls/hr IV Q24H [facility]


   Last Admin: 01/24/18 13:08 Dose:  100 mls/hr


Sodium Chloride (Normal Saline)  1,000 mls @ 1,000 mls/hr IV .Bolus ONE


   Stop: 01/23/18 14:21


   Last Admin: 01/23/18 15:21 Dose:  1,000 mls/hr


Influenza Virus Vaccine (Pharmacy To Dose - Influenza Vaccine)  1 each IM 

ONETIME ONE


   Stop: 01/23/18 12:41


Influenza Virus Vaccine (Fluarix Quad [id number])  60 mcg IM .ONCE ONE


   Stop: 01/23/18 12:46


   Last Admin: 01/24/18 10:25 Dose:  60 mcg


Oxybutynin Chloride (Oxybutynin)  5 mg PO DAILY [facility]


   Last Admin: 01/24/18 08:07 Dose:  Not Given


Potassium Chloride (Klor-Con M20)  40 meq PO ONETIME ONE


   Stop: 01/25/18 07:54


   Last Admin: 01/25/18 08:25 Dose:  40 meq











- Exam


General: Alert, Oriented, Cooperative


HEENT: Pupils Equal


Neck: Supple


Lungs: Clear to Auscultation, Normal Respiratory Effort


Cardiovascular: Regular Rate, Regular Rhythm


GI/Abdominal Exam: Normal Bowel Sounds, Soft


Extremities: Normal Inspection, Normal Range of Motion, Non-Tender, No Pedal 

Edema, Normal Capillary Refill


Peripheral Pulses: 3+: Posterior Tibial (L), Posterior Tibial (R)


Skin: Warm


Psy/Mental Status: Alert, Normal Affect, Normal Mood





- Problem List Review


Problem List Initiated/Reviewed/Updated: Yes





- My Orders


Last 24 Hours: 


My Active Orders





01/24/18 11:00


ALPRAZolam [Xanax]   0.5 mg PO TID PRN 





01/24/18 15:45


buPROPion [Wellbutrin XL]   450 mg PO DAILY 





01/24/18 21:00


DULoxetine [Cymbalta]   60 mg PO BID 





01/25/18 08:00


Cephalexin [Keflex]   500 mg PO Q12H 














- Plan


Plan:: 





Assessment:


#1. Rhabdomyolysis


#2. Urinary tract infection


#3. History of depression, anxiety





Plan:


#1. CK appears to be downtrending we'll continue to monitor this closely. 

Continue IV fluids and monitor for signs of fluid overload


#2. Switch Rocephin to by mouth Keflex as ordered


#3. Continue home medications for anxiety and depression. Continue nicotine 

patch for tobacco abuse


#4. This patient will need a psych referral when discharged for ongoing mental 

health issues.

## 2018-01-26 VITALS — DIASTOLIC BLOOD PRESSURE: 88 MMHG | SYSTOLIC BLOOD PRESSURE: 145 MMHG

## 2018-01-26 LAB
CHLORIDE SERPL-SCNC: 111 MMOL/L (ref 98–110)
SODIUM SERPL-SCNC: 144 MMOL/L (ref 136–146)

## 2018-01-26 RX ADMIN — DEXTROSE SCH UNITS: 10 SOLUTION INTRAVENOUS at 05:30

## 2018-01-26 RX ADMIN — BUPROPION HYDROCHLORIDE SCH MG: 150 TABLET, EXTENDED RELEASE ORAL at 08:31

## 2018-01-26 NOTE — [email or web address]
**Discharge Summary





- Hospital Course


Free Text/Narrative:: 





Admission date: January 23, 2018


Discharge date: January 26, 2018





Admission diagnosis:


1. Rhabdomyolysis


#2. Urinary tract infection


3. Lower extremity pain





Discharge diagnosis:


1. Rhabdomyolysis improving


2. UTI


3. Lower extremity pain resolved


4. History of tobacco abuse, anxiety, depression, hyperlipidemia





Hospital course: 62-year-old female with a history of anxiety and depression on 

several medications including Crestor for hyperlipidemia that presented with 

bilateral leg weakness. Signed to have an elevated CK and then was admitted for 

rhabdomyolysis. Also found to have a urinary tract infection. She was treated 

with IV Rocephin and then switched to Keflex for this. For her rhabdomyolysis, 

patient was given IV fluids at 250 mL an hour. Her CK was checked frequently 

and was reassuring. It is believed that the rhabdomyolysis, could either have 

possibly been secondary to her Crestor use or her mental health resulting her 

laying down for extensive periods that time. Crestor was discontinued prior to 

discharge. Patient is also encouraged to stay mobile as much as possible and 

not to lay down for long periods of time. This may have been secondary to her 

Xanax use. Patient seemed to be very anxious the first night when the 

medication was held. She did a lot better once her home medications including 

Cymbalta and Xanax are restarted in the hospital. Patient also has a history of 

tobacco abuse but does need counseling. She is also sent home on Keflex for her 

urinary tract infection.





- Discharge Data


Discharge Date: 01/26/18


Discharge Disposition: Home, Self-Care 01


Condition: Stable





- Patient Instructions


Diet: Usual Diet as Tolerated


Activity: As Tolerated


Driving: May Drive Today


Showering/Bathing: May Shower


Notify Provider of: Fever, Increased Pain, Swelling and Redness, Nausea and/or 

Vomiting





- Discharge Plan


Prescriptions/Med Rec: 


Cephalexin [Keflex] 500 mg PO Q12H 4 Days #8 cap


Potassium Chloride 20 meq PO DAILY 4 Days #4 tablet.er


Home Medications: 


 Home Meds





ALPRAZolam [Alprazolam] 0.5 - 1 mg PO TID PRN 04/08/17 [History]


DULoxetine [Cymbalta] 60 mg PO BID 04/08/17 [History]


Furosemide 20 mg PO DAILY 04/08/17 [History]


Methylphenidate HCl 20 mg PO TID 04/08/17 [History]


Metoprolol Succinate [Toprol XL] 25 mg PO DAILY 04/08/17 [History]


Oxybutynin 5 mg PO DAILY 04/08/17 [History]


amLODIPine [Norvasc] 10 mg PO DAILY 04/08/17 [History]


Omeprazole 40 mg PO DAILY 01/23/18 [History]


lamoTRIgine [Lamotrigine] 100 mg PO DAILY 01/23/18 [History]


buPROPion HCl [Wellbutrin Xl] 150 mg PO DAILY 01/24/18 [History]


buPROPion HCl [Wellbutrin Xl] 300 mg PO DAILY 01/24/18 [History]


Cephalexin [Keflex] 500 mg PO Q12H 4 Days #8 cap 01/26/18 [Rx]


Potassium Chloride 20 meq PO DAILY 4 Days #4 tablet.er 01/26/18 [Rx]








Patient Handouts:  Rhabdomyolysis, Cephalexin tablets or capsules, Potassium 

phosphate; Sodium Phosphate oral tablet


Referrals: 


Suraj Linder MD [Ordering Only Provider] - 02/08/18 10:00 am





- Discharge Summary/Plan Comment


Discharge Summary/Plan Comment: 








Admission date: January 23, 2018


Discharge date: January 26, 2018





Admission diagnosis:


1. Rhabdomyolysis


#2. Urinary tract infection


3. Lower extremity pain





Discharge diagnosis:


1. Rhabdomyolysis improving


2. UTI


3. Lower extremity pain resolved


4. History of tobacco abuse, anxiety, depression, hyperlipidemia





Hospital course: 62-year-old female with a history of anxiety and depression on 

several medications including Crestor for hyperlipidemia that presented with 

bilateral leg weakness. She was found to have an elevated CK and then was 

admitted for rhabdomyolysis. Also found to have a urinary tract infection. She 

was treated with IV Rocephin and then switched to Keflex for this. For her 

rhabdomyolysis, patient was given IV fluids at 250 mL an hour. Her CK was 

checked frequently and was reassuring. It is believed that the rhabdomyolysis, 

could either have possibly been secondary to her Crestor use or her mental 

health resulting her laying down for extensive periods that time. Crestor was 

discontinued prior to discharge. Patient is also encouraged to stay mobile as 

much as possible and not to lay down for long periods of time. This may have 

been secondary to her Xanax use. Patient seemed to be very anxious the first 

night when the medication was held. She did a lot better once her home 

medications including Cymbalta and Xanax are restarted in the hospital. Patient 

also has a history of tobacco abuse but does need counseling. She is also sent 

home on Keflex for her urinary tract infection.





- Patient Data


Vitals - Most Recent: 


 Last Vital Signs











Temp  36.4 C   01/26/18 08:00


 


Pulse  101 H  01/26/18 08:00


 


Resp  16   01/26/18 08:00


 


BP  145/88 H  01/26/18 08:00


 


Pulse Ox  98   01/26/18 08:00











Weight - Most Recent: 83.053 kg


I&O - Last 24 hours: 


 Intake & Output











 01/26/18 01/26/18 01/26/18





 06:59 14:59 22:59


 


Intake Total 3004 1374 


 


Output Total 3200 1575 


 


Balance -196 -201 











Lab Results - Last 24 hrs: 


 Laboratory Results - last 24 hr











  01/26/18 01/26/18 Range/Units





  05:07 05:07 


 


WBC  7.70   (4.0-11.0)  K/uL


 


RBC  4.55   (4.30-5.90)  M/uL


 


Hgb  13.7   (12.0-16.0)  g/dL


 


Hct  39.6   (36.0-46.0)  %


 


MCV  87.0   (80.0-98.0)  fL


 


MCH  30.1   (27.0-32.0)  pg


 


MCHC  34.6   (31.0-37.0)  g/dL


 


RDW Std Deviation  44.3   (28.0-62.0)  fl


 


RDW Coeff of Marla  14   (11.0-15.0)  %


 


Plt Count  257   (150-400)  K/uL


 


MPV  10.40   (7.40-12.00)  fL


 


Neut % (Auto)  57.4   (48.0-80.0)  %


 


Lymph % (Auto)  31.3   (16.0-40.0)  %


 


Mono % (Auto)  7.9   (0.0-15.0)  %


 


Eos % (Auto)  3.0   (0.0-7.0)  %


 


Baso % (Auto)  0.4   (0.0-1.5)  %


 


Neut # (Auto)  4.4   (1.4-5.7)  K/uL


 


Lymph # (Auto)  2.4   (0.6-2.4)  K/uL


 


Mono # (Auto)  0.6   (0.0-0.8)  K/uL


 


Eos # (Auto)  0.2   (0.0-0.7)  K/uL


 


Baso # (Auto)  0.0   (0.0-0.1)  K/uL


 


Nucleated RBC %  0.0   /100WBC


 


Nucleated RBCs #  0   K/uL


 


Sodium   144  (136-146)  mmol/L


 


Potassium   3.1 L  (3.5-5.1)  mmol/L


 


Chloride   111 H  ()  mmol/L


 


Carbon Dioxide   21  (21-31)  mmol/L


 


BUN   3 L  (6.0-23.0)  mg/dL


 


Creatinine   0.7  (0.6-1.5)  mg/dL


 


Est Cr Clr Drug Dosing   60.62  mL/min


 


Estimated GFR (MDRD)   > 60.0  ml/min


 


Glucose   91  ()  mg/dL


 


Calcium   8.7 L  (8.8-10.8)  mg/dL


 


Creatine Kinase   6998 H  (9-236)  IU/L











[facility] Results - Last 24 hrs: 


 Microbiology











 01/25/18 13:15 Campylobacter Antigen Assay - Final





 Stool / Feces    NEGATIVE CAMPYLOBACTER AG





  - Final





    NEGATIVE FOR SHIGA TOXIN 1





  - Final





    NEGATIVE FOR SHIGA TOXIN 2


 


 01/25/18 13:15 Clostridium difficile Toxin A&B (M) - Final





 Stool / Feces    Negative for C.Diff Toxin/AG











Med Orders - Current: 


 Current Medications








Discontinued Medications





Alprazolam (Xanax)  1 mg PO TID PRN


   PRN Reason: Anxiety


Alprazolam (Xanax)  0.5 mg PO TID PRN


   PRN Reason: Anxiety


   Last Admin: 01/25/18 21:49 Dose:  0.5 mg


Bupropion HCl (Wellbutrin Xl)  150 mg [facility]ILY [facility]


Bupropion HCl (Wellbutrin Xl)  300 mg PO DAILY [facility]


Bupropion HCl (Wellbutrin Xl)  300 mg PO DAILY [facility]


   Last Admin: 01/24/18 19:28 Dose:  Not Given


Bupropion HCl (Wellbutrin Xl)  450 mg PO DAILY [facility]


   Last Admin: 01/26/18 08:31 Dose:  450 mg


Cephalexin (Keflex)  500 mg PO Q12H SCOTTY


   Stop: 01/30/18 23:59


   Last Admin: 01/26/18 08:33 Dose:  500 mg


Duloxetine HCl (Cymbalta)  60 mg PO BID [facility]


   Last Admin: 01/26/18 08:31 Dose:  60 mg


Heparin Sodium (Porcine) (Heparin Sodium)  5,000 units SUBCUT Q8H [facility]


   Last Admin: 01/26/18 05:30 Dose:  5,000 units


Sodium Chloride (Normal Saline)  1,000 mls @ 999 mls/hr IV STAT ONE


   Stop: 01/23/18 11:08


   Last Admin: 01/23/18 10:29 Dose:  999 mls/hr


Sodium Chloride (Normal Saline)  1,000 mls @ 1,000 mls/hr IV .Bolus ONE


   Stop: 01/23/18 13:23


   Last Admin: 01/23/18 13:30 Dose:  1,000 mls/hr


Ceftriaxone Sodium/Dextrose 1 (gm/ Premix)  50 mls @ 100 mls/hr IV Q24H [facility]


   Last Admin: 01/24/18 13:08 Dose:  100 mls/hr


Sodium Chloride (Normal Saline)  1,000 mls @ 1,000 mls/hr IV .Bolus ONE


   Stop: 01/23/18 14:21


   Last Admin: 01/23/18 15:21 Dose:  1,000 mls/hr


Sodium Chloride (Normal Saline)  1,000 mls @ 250 mls/hr IV ASDIRECTED [facility]


   Last Admin: 01/26/18 05:50 Dose:  250 mls/hr


Influenza Virus Vaccine (Pharmacy To Dose - Influenza Vaccine)  1 each IM 

ONETIME ONE


   Stop: 01/23/18 12:41


Influenza Virus Vaccine (Fluarix Quad [id number])  60 mcg IM .ONCE ONE


   Stop: 01/23/18 12:46


   Last Admin: 01/24/18 10:25 Dose:  60 mcg


Nicotine (Habitrol)  21 mg TRDERM DAILY [facility]


   Last Admin: 01/26/18 08:35 Dose:  21 mg


Ondansetron HCl (Zofran)  4 mg IVPUSH Q4H PRN


   PRN Reason: Nausea/Vomiting


Oxybutynin Chloride (Oxybutynin)  5 mg PO DAILY [facility]


   Last Admin: 01/24/18 08:07 Dose:  Not Given


Oxybutynin Chloride (Oxybutynin)  5 mg PO BEDTIME [facility]


   Last Admin: 01/25/18 20:10 Dose:  5 mg


Potassium Chloride (Klor-Con M20)  40 meq PO ONETIME ONE


   Stop: 01/25/18 07:54


   Last Admin: 01/25/18 08:25 Dose:  40 meq


Potassium Chloride (Klor-Con M20)  40 meq PO ONETIME ONE


   Stop: 01/26/18 07:55


   Last Admin: 01/26/18 08:33 Dose:  40 meq











*Q Meaningful Use (DIS)





- VTE *Q


VTE Criteria *Q: 








- Stroke *Q


Stroke Criteria *Q: 








- AMI *Q


AMI Criteria *Q: